# Patient Record
Sex: FEMALE | Race: WHITE | NOT HISPANIC OR LATINO
[De-identification: names, ages, dates, MRNs, and addresses within clinical notes are randomized per-mention and may not be internally consistent; named-entity substitution may affect disease eponyms.]

---

## 2017-06-13 ENCOUNTER — APPOINTMENT (OUTPATIENT)
Dept: ORTHOPEDIC SURGERY | Facility: CLINIC | Age: 82
End: 2017-06-13

## 2017-06-13 DIAGNOSIS — M16.11 UNILATERAL PRIMARY OSTEOARTHRITIS, RIGHT HIP: ICD-10-CM

## 2017-06-20 ENCOUNTER — APPOINTMENT (OUTPATIENT)
Dept: ORTHOPEDIC SURGERY | Facility: CLINIC | Age: 82
End: 2017-06-20

## 2018-12-31 ENCOUNTER — RECORD ABSTRACTING (OUTPATIENT)
Age: 83
End: 2018-12-31

## 2018-12-31 DIAGNOSIS — Z87.898 PERSONAL HISTORY OF OTHER SPECIFIED CONDITIONS: ICD-10-CM

## 2018-12-31 DIAGNOSIS — Z87.39 PERSONAL HISTORY OF OTHER DISEASES OF THE MUSCULOSKELETAL SYSTEM AND CONNECTIVE TISSUE: ICD-10-CM

## 2018-12-31 DIAGNOSIS — Z86.73 PERSONAL HISTORY OF TRANSIENT ISCHEMIC ATTACK (TIA), AND CEREBRAL INFARCTION W/OUT RESIDUAL DEFICITS: ICD-10-CM

## 2018-12-31 DIAGNOSIS — Z78.9 OTHER SPECIFIED HEALTH STATUS: ICD-10-CM

## 2018-12-31 DIAGNOSIS — F03.90 UNSPECIFIED DEMENTIA W/OUT BEHAVIORAL DISTURBANCE: ICD-10-CM

## 2018-12-31 DIAGNOSIS — M16.10 UNILATERAL PRIMARY OSTEOARTHRITIS, UNSPECIFIED HIP: ICD-10-CM

## 2018-12-31 DIAGNOSIS — M43.10 SPONDYLOLISTHESIS, SITE UNSPECIFIED: ICD-10-CM

## 2018-12-31 DIAGNOSIS — E78.00 PURE HYPERCHOLESTEROLEMIA, UNSPECIFIED: ICD-10-CM

## 2018-12-31 DIAGNOSIS — Z86.39 PERSONAL HISTORY OF OTHER ENDOCRINE, NUTRITIONAL AND METABOLIC DISEASE: ICD-10-CM

## 2018-12-31 DIAGNOSIS — Z87.891 PERSONAL HISTORY OF NICOTINE DEPENDENCE: ICD-10-CM

## 2018-12-31 DIAGNOSIS — G89.4 CHRONIC PAIN SYNDROME: ICD-10-CM

## 2018-12-31 DIAGNOSIS — Z87.19 PERSONAL HISTORY OF OTHER DISEASES OF THE DIGESTIVE SYSTEM: ICD-10-CM

## 2018-12-31 DIAGNOSIS — Z86.69 PERSONAL HISTORY OF OTHER DISEASES OF THE NERVOUS SYSTEM AND SENSE ORGANS: ICD-10-CM

## 2019-01-08 ENCOUNTER — APPOINTMENT (OUTPATIENT)
Dept: PAIN MANAGEMENT | Facility: CLINIC | Age: 84
End: 2019-01-08

## 2019-02-05 ENCOUNTER — APPOINTMENT (OUTPATIENT)
Dept: GERIATRICS | Facility: CLINIC | Age: 84
End: 2019-02-05
Payer: MEDICARE

## 2019-02-05 VITALS
HEART RATE: 61 BPM | OXYGEN SATURATION: 98 % | HEIGHT: 63 IN | TEMPERATURE: 98.1 F | WEIGHT: 105 LBS | SYSTOLIC BLOOD PRESSURE: 128 MMHG | BODY MASS INDEX: 18.61 KG/M2 | DIASTOLIC BLOOD PRESSURE: 70 MMHG

## 2019-02-05 VITALS — SYSTOLIC BLOOD PRESSURE: 130 MMHG | DIASTOLIC BLOOD PRESSURE: 78 MMHG

## 2019-02-05 DIAGNOSIS — B96.89 ACUTE VAGINITIS: ICD-10-CM

## 2019-02-05 DIAGNOSIS — N76.0 ACUTE VAGINITIS: ICD-10-CM

## 2019-02-05 PROCEDURE — 99213 OFFICE O/P EST LOW 20 MIN: CPT

## 2019-02-06 RX ORDER — NYSTATIN 100000 U/G
100000 OINTMENT TOPICAL
Refills: 0 | Status: COMPLETED | COMMUNITY

## 2019-02-06 NOTE — ASSESSMENT
[FreeTextEntry1] : Bacterial vaginosis: No evidence of current infection.  Patient has a follow up with GYN on 2/18/2019, can be retested then.  Systemic reviews of trials of probiotics for treatment of BV have not found sufficient evidence for or against efficacy however given minimal risk of harm with probiotic administration, discussed taking daily probiotic to help reestablish vaginal foreign lactobacilli. \par \par Follow up with GYN and PCP as scheduled.

## 2019-02-06 NOTE — PHYSICAL EXAM
[No Acute Distress] : no acute distress [No Respiratory Distress] : no respiratory distress  [Clear to Auscultation] : lungs were clear to auscultation bilaterally [Normal Rate] : normal rate  [Regular Rhythm] : with a regular rhythm [Normal S1, S2] : normal S1 and S2 [No Murmur] : no murmur heard [No Edema] : there was no peripheral edema [Soft] : abdomen soft [Non Tender] : non-tender [Non-distended] : non-distended [Normal Bowel Sounds] : normal bowel sounds [External Female Genitalia] : normal external genitalia [Speech Grossly Normal] : speech grossly normal [Normal Mood] : the mood was normal [FreeTextEntry1] : No discharge noted

## 2019-02-06 NOTE — HEALTH RISK ASSESSMENT
[Intercurrent ED visits] : went to ED [] : No [One fall no injury in past year] : Patient reported one fall in the past year without injury [(PHQ-2) Unable to screen] : PHQ-2: unable to screen [UnableToScreenReason] : Dementia

## 2019-02-06 NOTE — REVIEW OF SYSTEMS
[Fever] : no fever [Chills] : no chills [Abdominal Pain] : no abdominal pain [Dysuria] : no dysuria [Vaginal Discharge] : vaginal discharge [Negative] : Genitourinary

## 2019-02-06 NOTE — HISTORY OF PRESENT ILLNESS
[Other: _____] : [unfilled] [FreeTextEntry1] : Follow up ED visit [de-identified] : 87 year old female with a history of HLD, hypothyroidism, HTN, dementia, lumbar spondylosis who presents today for an ER follow up. Patient is accompanied by her son, Kumar, who is the primary caretaker at home.\par \par Patient with two visits to the ED for vaginal discharge on 1/19/2019 and 2/3/2019.  On the first visit patient discharged with Flagyl 250 mg tabs, 2 tabs BID for 7 days.  Son brought patient back to the ED on 2/3/2019 states vaginal discharge still present.  Question if Flagyl was being taken correctly, as there were 4 doses left.   \par \par Currently son states there is still discharge noted when patient using the toilet and patient complained to him of being "sticky."  Patient denies any vaginal irritation or dysuria. \par

## 2019-02-18 ENCOUNTER — APPOINTMENT (OUTPATIENT)
Dept: OBGYN | Facility: CLINIC | Age: 84
End: 2019-02-18
Payer: MEDICARE

## 2019-02-18 VITALS
BODY MASS INDEX: 18.61 KG/M2 | HEIGHT: 63 IN | WEIGHT: 105 LBS | SYSTOLIC BLOOD PRESSURE: 110 MMHG | DIASTOLIC BLOOD PRESSURE: 70 MMHG

## 2019-02-18 PROCEDURE — 99213 OFFICE O/P EST LOW 20 MIN: CPT

## 2019-03-28 ENCOUNTER — APPOINTMENT (OUTPATIENT)
Dept: GERIATRICS | Facility: CLINIC | Age: 84
End: 2019-03-28
Payer: MEDICARE

## 2019-03-28 VITALS
TEMPERATURE: 97.9 F | HEART RATE: 67 BPM | BODY MASS INDEX: 18.25 KG/M2 | SYSTOLIC BLOOD PRESSURE: 124 MMHG | WEIGHT: 103 LBS | DIASTOLIC BLOOD PRESSURE: 68 MMHG | OXYGEN SATURATION: 96 %

## 2019-03-28 DIAGNOSIS — R41.89 OTHER SYMPTOMS AND SIGNS INVOLVING COGNITIVE FUNCTIONS AND AWARENESS: ICD-10-CM

## 2019-03-28 DIAGNOSIS — R33.9 RETENTION OF URINE, UNSPECIFIED: ICD-10-CM

## 2019-03-28 PROCEDURE — 99214 OFFICE O/P EST MOD 30 MIN: CPT

## 2019-03-28 RX ORDER — LEVOTHYROXINE SODIUM 0.05 MG/1
50 TABLET ORAL DAILY
Qty: 90 | Refills: 3 | Status: ACTIVE | COMMUNITY
Start: 2018-11-05

## 2019-03-28 RX ORDER — DONEPEZIL HYDROCHLORIDE 10 MG/1
10 TABLET ORAL
Refills: 0 | Status: DISCONTINUED | COMMUNITY
End: 2019-03-28

## 2019-03-28 RX ORDER — LIDOCAINE AND PRILOCAINE 25; 25 MG/G; MG/G
2.5-2.5 CREAM TOPICAL
Refills: 0 | Status: DISCONTINUED | COMMUNITY
End: 2019-03-28

## 2019-03-28 RX ORDER — DICLOFENAC SODIUM 10 MG/G
1 GEL TOPICAL
Refills: 0 | Status: DISCONTINUED | COMMUNITY
End: 2019-03-28

## 2019-03-28 NOTE — HISTORY OF PRESENT ILLNESS
[Moderate] : Stage: Moderate [Stable] : Status: Stable [Memory Lapses Or Loss] : worsened memory impairment [Patient Observed To Be Agitated] : denies agitation [Hostility Toward Caregivers] : denies aggression [Sleep Disturbances] : stable sleep disturbances [] : denies wandering [Fixed Beliefs Contradicted By Reality (Delusions)] : denies delusions [Difficulty Finding Desired Words] : denies difficulty finding desired words [0] : 2) Feeling down, depressed, or hopeless: Not at all [PHQ-2 Score ___] : PHQ-2 Score [unfilled] [FreeTextEntry1] : Presenting with son Kumar\par Now to see urology tomorrow and uregynecology in early April\par Issue is that she is frequently urinating and there is a fear she has incomplete emptying\par \par Patient did have issues earlier this year -  fluid in endometrium found incidentially coupled with vaginal bleeding and had seen GYN.  Endometrial biopsy was negative for cancer.   She also has been treated for several infections - suspected bacterial vaginosis and fungal infections for vaginal discharge.  GYN has cleared her and has found no infections or abnormalities. \par \par Patient with advancing dementia but mary Heath is concerned about quality of life and wants to see if there is anything that can be done to help her bladder emptying.  Now concern is whether medication agent such as urecholine or oxybutynin would help patient.\par \par

## 2019-03-28 NOTE — REVIEW OF SYSTEMS
[Loss Of Hearing] : hearing loss [Incontinence] : incontinence [Vaginal Discharge] : vaginal discharge [Arthralgias] : arthralgias [Joint Pain] : joint pain [Confused] : confusion [Fever] : no fever [Chills] : no chills [Feeling Poorly] : not feeling poorly [Feeling Tired] : not feeling tired [Eye Pain] : no eye pain [Red Eyes] : eyes not red [Earache] : no earache [Chest Pain] : no chest pain [Palpitations] : no palpitations [Cough] : no cough [SOB on Exertion] : no shortness of breath during exertion [Constipation] : no constipation [Diarrhea] : no diarrhea [Dysuria] : no dysuria [Abn Vaginal Bleeding] : no unexplained vaginal bleeding [Skin Lesions] : no skin lesions [Skin Wound] : no skin wound [Dizziness] : no dizziness [Fainting] : no fainting

## 2019-03-28 NOTE — ASSESSMENT
[FreeTextEntry1] : Now to see urology and uogynecology to discuss medications for quality of life\par to improve frequent urinatoin and investigate complaint of incomplete urinary emptying (retention) which was noted when patient went for transvaginal US\par once family has recevied treatment options from urology and uregynecology plan is to move to [place].\par They are hoping to get more symptomatic relief for patient prior to leaving for [place] and are willing to delay move in order to get answers/treatment.\par \par medications are stable\par uses PRN HTCZ for LE edema but rarely using at this time\par continue thyroid medicaiton and BP medication

## 2019-03-28 NOTE — SOCIAL HISTORY
[No falls in past year] : Patient reported no falls in the past year [Some assistance needed] : managing finances [Canes] : deborah [Smoke Detector] : smoke detector [Carbon Monoxide Detector] : carbon monoxide detector [Shower Chair] : shower chair [Grab Bars] : no grab bars [Driving] : not driving [de-identified] : as needed

## 2019-03-28 NOTE — PHYSICAL EXAM
[General Appearance - Alert] : alert [General Appearance - In No Acute Distress] : in no acute distress [General Appearance - Well Nourished] : well nourished [General Appearance - Well Developed] : well developed [Sclera] : the sclera and conjunctiva were normal [Extraocular Movements] : extraocular movements were intact [Strabismus] : no strabismus was seen [Normal Oral Mucosa] : normal oral mucosa [No Oral Pallor] : no oral pallor [Neck Appearance] : the appearance of the neck was normal [Neck Cervical Mass (___cm)] : no neck mass was observed [Jugular Venous Distention Increased] : there was no jugular-venous distention [Respiration, Rhythm And Depth] : normal respiratory rhythm and effort [Exaggerated Use Of Accessory Muscles For Inspiration] : no accessory muscle use [Auscultation Breath Sounds / Voice Sounds] : lungs were clear to auscultation bilaterally [Heart Rate And Rhythm] : heart rate was normal and rhythm regular [Heart Sounds] : normal S1 and S2 [Heart Sounds Gallop] : no gallops [Heart Sounds Pericardial Friction Rub] : no pericardial rub [Bowel Sounds] : normal bowel sounds [Abdomen Soft] : soft [Abdomen Tenderness] : non-tender [No CVA Tenderness] : no ~M costovertebral angle tenderness [No Spinal Tenderness] : no spinal tenderness [Skin Color & Pigmentation] : normal skin color and pigmentation [] : no rash

## 2019-11-08 ENCOUNTER — APPOINTMENT (OUTPATIENT)
Dept: GERIATRICS | Facility: CLINIC | Age: 84
End: 2019-11-08
Payer: MEDICARE

## 2019-11-08 ENCOUNTER — RESULT REVIEW (OUTPATIENT)
Age: 84
End: 2019-11-08

## 2019-11-08 VITALS
TEMPERATURE: 98.7 F | DIASTOLIC BLOOD PRESSURE: 70 MMHG | WEIGHT: 107 LBS | OXYGEN SATURATION: 98 % | BODY MASS INDEX: 18.95 KG/M2 | SYSTOLIC BLOOD PRESSURE: 130 MMHG | HEART RATE: 81 BPM

## 2019-11-08 DIAGNOSIS — Z86.79 PERSONAL HISTORY OF OTHER DISEASES OF THE CIRCULATORY SYSTEM: ICD-10-CM

## 2019-11-08 DIAGNOSIS — R22.2 LOCALIZED SWELLING, MASS AND LUMP, TRUNK: ICD-10-CM

## 2019-11-08 DIAGNOSIS — R29.898 OTHER SYMPTOMS AND SIGNS INVOLVING THE MUSCULOSKELETAL SYSTEM: ICD-10-CM

## 2019-11-08 DIAGNOSIS — E03.9 HYPOTHYROIDISM, UNSPECIFIED: ICD-10-CM

## 2019-11-08 DIAGNOSIS — R26.9 UNSPECIFIED ABNORMALITIES OF GAIT AND MOBILITY: ICD-10-CM

## 2019-11-08 PROCEDURE — 99214 OFFICE O/P EST MOD 30 MIN: CPT

## 2019-11-08 RX ORDER — LIDOCAINE 5% 700 MG/1
5 PATCH TOPICAL
Qty: 30 | Refills: 3 | Status: ACTIVE | COMMUNITY
Start: 2019-11-08 | End: 1900-01-01

## 2019-11-09 PROBLEM — R29.898 WEAKNESS OF BOTH LOWER EXTREMITIES: Status: ACTIVE | Noted: 2019-11-08

## 2019-11-09 PROBLEM — Z86.79 HISTORY OF ESSENTIAL HYPERTENSION: Status: RESOLVED | Noted: 2018-12-31 | Resolved: 2019-11-09

## 2019-11-09 PROBLEM — E03.9 HYPOTHYROID: Status: ACTIVE | Noted: 2018-12-31

## 2019-11-09 RX ORDER — OMEPRAZOLE 40 MG/1
40 CAPSULE, DELAYED RELEASE ORAL TWICE DAILY
Refills: 0 | Status: ACTIVE | COMMUNITY
Start: 2019-11-09

## 2019-11-09 RX ORDER — AMLODIPINE BESYLATE 5 MG/1
5 TABLET ORAL DAILY
Qty: 90 | Refills: 1 | Status: ACTIVE | COMMUNITY
Start: 2019-11-09

## 2019-11-09 RX ORDER — HYDROCHLOROTHIAZIDE 12.5 MG/1
12.5 TABLET ORAL
Refills: 0 | Status: DISCONTINUED | COMMUNITY
End: 2019-11-09

## 2019-11-09 RX ORDER — ATENOLOL 100 MG/1
100 TABLET ORAL DAILY
Qty: 90 | Refills: 2 | Status: ACTIVE | COMMUNITY

## 2019-11-09 NOTE — HISTORY OF PRESENT ILLNESS
[FreeTextEntry2] : 88 year old female with a history of OA, BV, dementia, HLD, hypothyroidism who presents today for a post discharge follow up. \par \par For the last 3 months patient has been in the hospital and rehab.  Initially brought to the ED after sustaining a fall at home.  No fractures or injures.  She was then transferred to a SNF however returned to the hospital about 21 days after.  She was diagnosed with a UTI, stayed at [facility] for about 3 weeks.  She returned to a different SNF.  Was only there for 6 days then started vomiting.  She was sent back to the hospital for another 3 weeks.  She was discharged to a third SNF.  Patient is now back home with her son Kumar.  She is doing well, no acute complaints from her son.  Wellness appointment with PCP in December.  \par \par

## 2019-11-09 NOTE — ASSESSMENT
[FreeTextEntry1] : Abdominal wall mass: Will send for US for further evaluation. \par \par HTN: Patient had been taking atenolol 100 mg daily and amlodipine 5 mg daily at the SNF.  Since being home son has been giving atenolol 25 mg and amlodipine 5 mg.  BP today acceptable.  Continue to monitor.  Check CMP. \par \par Hypothyroidism: Continue with LT4 50 mcg daily.  Check TFTs. \par \par LE weakness: Patient deconditioned from extended stay at SNF.  Referral for PT for strengthening ad gait training. \par \par Received flu vaccine at hospital.  \par \par Follow up with PCP as scheduled.

## 2019-11-09 NOTE — REVIEW OF SYSTEMS
[Back Pain] : back pain [Negative] : Heme/Lymph [Incontinence] : incontinence [Memory Loss] : memory loss [FreeTextEntry7] : Mass on abdomen

## 2019-11-09 NOTE — PHYSICAL EXAM
[No Acute Distress] : no acute distress [Well-Appearing] : well-appearing [PERRL] : pupils equal round and reactive to light [Normal Sclera/Conjunctiva] : normal sclera/conjunctiva [EOMI] : extraocular movements intact [Normal Outer Ear/Nose] : the outer ears and nose were normal in appearance [Normal Oropharynx] : the oropharynx was normal [Normal TMs] : both tympanic membranes were normal [No Lymphadenopathy] : no lymphadenopathy [No JVD] : no jugular venous distention [Supple] : supple [No Respiratory Distress] : no respiratory distress  [Thyroid Normal, No Nodules] : the thyroid was normal and there were no nodules present [Clear to Auscultation] : lungs were clear to auscultation bilaterally [No Accessory Muscle Use] : no accessory muscle use [Normal Rate] : normal rate  [Normal S1, S2] : normal S1 and S2 [Regular Rhythm] : with a regular rhythm [No Abdominal Bruit] : a ~M bruit was not heard ~T in the abdomen [No Varicosities] : no varicosities [No Palpable Aorta] : no palpable aorta [No Edema] : there was no peripheral edema [No Extremity Clubbing/Cyanosis] : no extremity clubbing/cyanosis [Soft] : abdomen soft [Non Tender] : non-tender [Non-distended] : non-distended [No Masses] : no abdominal mass palpated [No HSM] : no HSM [Normal Posterior Cervical Nodes] : no posterior cervical lymphadenopathy [Normal Anterior Cervical Nodes] : no anterior cervical lymphadenopathy [Normal Bowel Sounds] : normal bowel sounds [No CVA Tenderness] : no CVA  tenderness [No Joint Swelling] : no joint swelling [No Spinal Tenderness] : no spinal tenderness [No Rash] : no rash [Normal Affect] : the affect was normal [Normal Mood] : the mood was normal [de-identified] : soft mass epigastric-RUQ, tender to touch [de-identified] : Sitting in wheelchair [de-identified] : BLLE 3+/5

## 2019-11-09 NOTE — HEALTH RISK ASSESSMENT
[(PHQ-2) Unable to screen] : PHQ-2: unable to screen [One fall no injury in past year] : Patient reported one fall in the past year without injury [UnableToScreenReason] : Dementia [None] : None [With Family] : lives with family [] :  [Sexually Active] : not sexually active

## 2019-11-18 ENCOUNTER — RESULT REVIEW (OUTPATIENT)
Age: 84
End: 2019-11-18

## 2019-12-10 ENCOUNTER — APPOINTMENT (OUTPATIENT)
Dept: GERIATRICS | Facility: CLINIC | Age: 84
End: 2019-12-10

## 2020-09-22 ENCOUNTER — APPOINTMENT (OUTPATIENT)
Dept: GERIATRICS | Facility: CLINIC | Age: 85
End: 2020-09-22

## 2020-09-22 DIAGNOSIS — Z00.00 ENCOUNTER FOR GENERAL ADULT MEDICAL EXAMINATION W/OUT ABNORMAL FINDINGS: ICD-10-CM

## 2020-09-22 DIAGNOSIS — E78.5 HYPERLIPIDEMIA, UNSPECIFIED: ICD-10-CM

## 2020-12-21 PROBLEM — N76.0 BACTERIAL VAGINOSIS: Status: RESOLVED | Noted: 2019-02-06 | Resolved: 2020-12-21
